# Patient Record
Sex: MALE | Race: WHITE | NOT HISPANIC OR LATINO | Employment: FULL TIME | URBAN - METROPOLITAN AREA
[De-identification: names, ages, dates, MRNs, and addresses within clinical notes are randomized per-mention and may not be internally consistent; named-entity substitution may affect disease eponyms.]

---

## 2023-11-29 ENCOUNTER — APPOINTMENT (OUTPATIENT)
Dept: OPHTHALMOLOGY | Facility: CLINIC | Age: 68
End: 2023-11-29
Payer: COMMERCIAL

## 2023-12-10 ASSESSMENT — EXTERNAL EXAM - RIGHT EYE: OD_EXAM: NORMAL

## 2023-12-10 ASSESSMENT — EXTERNAL EXAM - LEFT EYE: OS_EXAM: NORMAL

## 2023-12-10 ASSESSMENT — CUP TO DISC RATIO: OS_RATIO: 0.6

## 2023-12-10 ASSESSMENT — SLIT LAMP EXAM - LIDS
COMMENTS: GOOD POSITION
COMMENTS: GOOD POSITION

## 2023-12-11 ENCOUNTER — OFFICE VISIT (OUTPATIENT)
Dept: OPHTHALMOLOGY | Facility: CLINIC | Age: 68
End: 2023-12-11
Payer: COMMERCIAL

## 2023-12-11 DIAGNOSIS — H52.4 PRESBYOPIA: ICD-10-CM

## 2023-12-11 DIAGNOSIS — Z98.41: ICD-10-CM

## 2023-12-11 DIAGNOSIS — H43.9 VITREOUS DEBRIS: ICD-10-CM

## 2023-12-11 DIAGNOSIS — H40.002 GLAUCOMA SUSPECT, LEFT EYE: ICD-10-CM

## 2023-12-11 DIAGNOSIS — H35.371 EPIRETINAL MEMBRANE, RIGHT EYE: ICD-10-CM

## 2023-12-11 DIAGNOSIS — H52.13 MYOPIA OF BOTH EYES: ICD-10-CM

## 2023-12-11 DIAGNOSIS — Z83.518 FAMILY HISTORY OF MACULAR DEGENERATION: ICD-10-CM

## 2023-12-11 DIAGNOSIS — Z96.1 PSEUDOPHAKIA: ICD-10-CM

## 2023-12-11 DIAGNOSIS — H18.513 CORNEAL GUTTATA OF BOTH EYES: ICD-10-CM

## 2023-12-11 DIAGNOSIS — H26.492 PCO (POSTERIOR CAPSULAR OPACIFICATION), LEFT: ICD-10-CM

## 2023-12-11 DIAGNOSIS — H53.2 DIPLOPIA: ICD-10-CM

## 2023-12-11 DIAGNOSIS — H40.1111 PRIMARY OPEN ANGLE GLAUCOMA (POAG) OF RIGHT EYE, MILD STAGE: Primary | ICD-10-CM

## 2023-12-11 PROCEDURE — 92133 CPTRZD OPH DX IMG PST SGM ON: CPT | Performed by: OPHTHALMOLOGY

## 2023-12-11 PROCEDURE — 99214 OFFICE O/P EST MOD 30 MIN: CPT | Performed by: OPHTHALMOLOGY

## 2023-12-11 ASSESSMENT — REFRACTION_WEARINGRX
OS_CYLINDER: -0.25
OS_ADD: +2.50
OS_SPHERE: -2.25
OD_SPHERE: -1.75
OD_ADD: +2.50
OS_AXIS: 090
OD_CYLINDER: SPHERE

## 2023-12-11 ASSESSMENT — REFRACTION_MANIFEST
OS_SPHERE: -2.25
OS_ADD: +2.50
OD_ADD: +2.50
OD_CYLINDER: SPHERE
OD_SPHERE: -1.75
OS_AXIS: 090
OS_CYLINDER: -0.25

## 2023-12-11 ASSESSMENT — TONOMETRY
IOP_METHOD: GOLDMANN APPLANATION
OS_IOP_MMHG: 10
OD_IOP_MMHG: 10

## 2023-12-11 ASSESSMENT — VISUAL ACUITY
METHOD: SNELLEN - LINEAR
OD_CC: 20/20
OS_CC: 20/20

## 2023-12-11 ASSESSMENT — ENCOUNTER SYMPTOMS: EYES NEGATIVE: 1

## 2023-12-11 ASSESSMENT — CUP TO DISC RATIO: OD_RATIO: 0.75

## 2023-12-11 NOTE — PROGRESS NOTES
Open-angle glaucoma of right eye, mild [id number]  Glaucoma suspect of left eyeH40.002  -(+)FH glaucoma - father (traumatic glaucoma)  -Was considered a glaucoma suspect in the past at [facility].  -Increased cup to disc ratio OU. IOP WNL.   -Pachymetry (2/26/20) - 620/608  -OCT RNFL (11/29/22) - OD: Thin S. OS: Bord S. 87/84. Stable from 6/19/19.   -HVF 24-2 (5/24/23) - OD: 1/14FL 4%FP 2%FN. Possible early inferior arcuate. OS: 1/14FL 4%FP 3%FN. WNL. Stable from 11/10/21.   -Saw Dr. Waters March 2020 - agree glaucoma suspect and consider treating if any change/reproducibility on visual field testing.   -Plan: Thick corneas, normal IOP, however borderline changes on OCT RNFL and HVF 24-2 OD>OS. Changes/defects have been reproducible on subsequent testing. Given this information, intiated treatment OD - started Timolol 0.5% OD qAM (11/10/21), based on OCT RNFL, recommend using Timolol OU qAM (7/27/22) - continue OU. F/u 6 months for comprehensive exam with refraction, dilation, OCT RNFL.     History of YAG laser capsulotomy of lens of right eyeZ98.41  Pseudophakia of left eyeZ96.1 - 3/18/21  PCO (posterior capsular opacification), [id number]  Pseudophakia of right eyeZ96.1 - 7/11/19  -S/p YAG capsulotomy OD 10/18/22 - vision improved. No retinal tear or detachment seen on exam. Monitor with annual dilated exams unless symptoms arise. F/u 6 months for comprehensive exam with refraction, dilation, OCT RNFL.     Vitreous dsvcxvZ08.9  Epiretinal membrane (ERM) of right eyeH35.371  -OCT macula (5/24/23) - SS: 10/10 OD and 9/10 OS. OD: Mild ERM temporal to fovea. No edema. Intact IS-OS. OS: Normal thickness and contour. Intact IS-OS. No edema. 320/273. Stable from 9/28/22.    -Noticing floater OD is more symptomatic, mostly when both eyes are open, not as much if OS is closed. Unclear reason for this unless also noticing floaters OS when both eyes are open. No retinal tear or detachment seen on last dilated exam.  Monitor with annual dilated exams. Recommend observation for now and consider PPV if becomes more symptomatic.  -Monitor ERM for now, plan to recheck OCT macula in 1 year.  We will consider referral to retina service as needed.     Cornea jqoklekO63.51  -Rare guttata OU. No significant corneal edema at this time. Monitor.     Family history of macular ounkhudhksxvK33.518  -(+)Father - wet?  -Macular exam WNL at this time. Monitor.     Bilateral jrqnjjB63.13  Bilateral sxecsxscbbX86.4  -F/u 6 months for comprehensive exam with refraction, dilation, OCT RNFL.

## 2023-12-11 NOTE — PROGRESS NOTES
Open-angle glaucoma of right eye, mild pobxoC26.10X1  Glaucoma suspect of left eyeH40.002  -(+)FH glaucoma - father (traumatic glaucoma)  -Was considered a glaucoma suspect in the past at [facility].  -Increased cup to disc ratio OU. IOP WNL.   -Pachymetry (2/26/20) - 620/608  -OCT RNFL (12/11/23) - SS: 7/10 OU. OD: Thin S. OS: Thin S. 82/79. Stable OD, possible superior progression OS compared to 11/29/22.   -HVF 24-2 (5/24/23) - OD: 1/14FL 4%FP 2%FN. Possible early inferior arcuate. OS: 1/14FL 4%FP 3%FN. WNL. Stable from 11/10/21.   -Saw Dr. Waters March 2020 - agree glaucoma suspect and consider treating if any change/reproducibility on visual field testing.   -Plan: Thick corneas, normal IOP, however borderline changes on OCT RNFL and HVF 24-2 OD>OS. Changes/defects have been reproducible on subsequent testing. Given this information, intiated treatment OD - started Timolol 0.5% OD qAM (11/10/21), based on OCT RNFL, recommend using Timolol OU qAM (7/27/22) - continue OU. F/u 3-4 months for IOP check, repeat OCT RNFL and HVF 24-2. May consider adding second gtt vs SLT if true progression seen on testing.     History of YAG laser capsulotomy of lens of right eyeZ98.41  Pseudophakia of left eyeZ96.1 - 3/18/21  PCO (posterior capsular opacification), fanihL94.491  PCO (posterior capsular opacification), left eye  Pseudophakia of right eyeZ96.1 - 7/11/19  -S/p YAG capsulotomy OD 10/18/22 - vision improved. Mild PCO OS, not visually significant. Will monitor for now and can consider YAG capsulotomy in the future if condition worsens.     Diplopia  -For the past 1-2 years, has noted some mild difficulty focusing at distance after looking at near object. Mild binocular oblique diplopia. Notices at end gaze on left and right gaze.  -On exam, full motility, no significant ocular misalignment noted, however, patient with mild right head tilt and notes that oblique diplopia is worse on right gaze.   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 Patient states in 2019 fell and hit head, had loss of consciousness.  -CT head wo contrast (2/12/19) - no acute intracranial abnormality.   -Tried prisms in office - patient did not notice any significant improvement and prefers no prism at this time. Monitor. Can consider repeat imaging if condition worsens.     Vitreous rlwihiF54.9  Epiretinal membrane (ERM) of right eyeH35.371  -OCT macula (5/24/23) - SS: 10/10 OD and 9/10 OS. OD: Mild ERM temporal to fovea. No edema. Intact IS-OS. OS: Normal thickness and contour. Intact IS-OS. No edema. 320/273. Stable from 9/28/22.    -Noticing floater OD is more symptomatic, mostly when both eyes are open, not as much if OS is closed. Unclear reason for this unless also noticing floaters OS when both eyes are open. No retinal tear or detachment seen on last dilated exam. Monitor with annual dilated exams. Recommend observation for now and consider PPV if becomes more symptomatic.  -Monitor ERM for now, plan to recheck OCT macula in 1 year.  We will consider referral to retina service as needed.     Cornea haqztndK36.51  -Rare guttata OU. No significant corneal edema at this time. Monitor.     Family history of macular suxwfhujeivbQ80.518  -(+)Father - wet?  -Macular exam WNL at this time. Monitor.     Bilateral jhetqcL49.13  Bilateral gkthmyaxfpX63.4  -Continue current glasses.

## 2024-01-24 ENCOUNTER — APPOINTMENT (OUTPATIENT)
Dept: OPHTHALMOLOGY | Facility: CLINIC | Age: 69
End: 2024-01-24
Payer: COMMERCIAL

## 2024-02-29 ENCOUNTER — OFFICE VISIT (OUTPATIENT)
Dept: PRIMARY CARE | Facility: CLINIC | Age: 69
End: 2024-02-29
Payer: COMMERCIAL

## 2024-02-29 ENCOUNTER — LAB (OUTPATIENT)
Dept: LAB | Facility: LAB | Age: 69
End: 2024-02-29
Payer: COMMERCIAL

## 2024-02-29 VITALS
HEART RATE: 67 BPM | WEIGHT: 237.6 LBS | HEIGHT: 74 IN | SYSTOLIC BLOOD PRESSURE: 157 MMHG | BODY MASS INDEX: 30.49 KG/M2 | DIASTOLIC BLOOD PRESSURE: 99 MMHG | TEMPERATURE: 97.2 F

## 2024-02-29 DIAGNOSIS — Z00.00 HEALTHCARE MAINTENANCE: Primary | ICD-10-CM

## 2024-02-29 DIAGNOSIS — I10 HYPERTENSION, UNSPECIFIED TYPE: ICD-10-CM

## 2024-02-29 DIAGNOSIS — Z00.00 HEALTHCARE MAINTENANCE: ICD-10-CM

## 2024-02-29 DIAGNOSIS — E03.9 HYPOTHYROIDISM, UNSPECIFIED TYPE: ICD-10-CM

## 2024-02-29 PROBLEM — H35.371 EPIRETINAL MEMBRANE, RIGHT EYE: Status: RESOLVED | Noted: 2023-12-11 | Resolved: 2024-02-29

## 2024-02-29 PROBLEM — H52.13 MYOPIA OF BOTH EYES: Status: RESOLVED | Noted: 2023-12-11 | Resolved: 2024-02-29

## 2024-02-29 PROBLEM — Z98.41: Status: RESOLVED | Noted: 2023-12-11 | Resolved: 2024-02-29

## 2024-02-29 PROBLEM — U07.1 COVID-19: Status: RESOLVED | Noted: 2024-02-29 | Resolved: 2024-02-29

## 2024-02-29 PROBLEM — H52.4 PRESBYOPIA: Status: RESOLVED | Noted: 2023-12-11 | Resolved: 2024-02-29

## 2024-02-29 PROBLEM — Z83.518 FAMILY HISTORY OF MACULAR DEGENERATION: Status: RESOLVED | Noted: 2023-12-11 | Resolved: 2024-02-29

## 2024-02-29 PROBLEM — H18.513 CORNEAL GUTTATA OF BOTH EYES: Status: RESOLVED | Noted: 2023-12-11 | Resolved: 2024-02-29

## 2024-02-29 PROBLEM — H53.2 DIPLOPIA: Status: RESOLVED | Noted: 2023-12-11 | Resolved: 2024-02-29

## 2024-02-29 PROBLEM — H40.1111 PRIMARY OPEN ANGLE GLAUCOMA (POAG) OF RIGHT EYE, MILD STAGE: Status: RESOLVED | Noted: 2023-12-11 | Resolved: 2024-02-29

## 2024-02-29 PROBLEM — H26.492 PCO (POSTERIOR CAPSULAR OPACIFICATION), LEFT: Status: RESOLVED | Noted: 2023-12-11 | Resolved: 2024-02-29

## 2024-02-29 LAB
ALBUMIN SERPL BCP-MCNC: 4.4 G/DL (ref 3.4–5)
ALP SERPL-CCNC: 63 U/L (ref 33–136)
ALT SERPL W P-5'-P-CCNC: 30 U/L (ref 10–52)
ANION GAP SERPL CALC-SCNC: 11 MMOL/L (ref 10–20)
AST SERPL W P-5'-P-CCNC: 19 U/L (ref 9–39)
BASOPHILS # BLD AUTO: 0.04 X10*3/UL (ref 0–0.1)
BASOPHILS NFR BLD AUTO: 0.6 %
BILIRUB SERPL-MCNC: 0.6 MG/DL (ref 0–1.2)
BUN SERPL-MCNC: 19 MG/DL (ref 6–23)
CALCIUM SERPL-MCNC: 9.5 MG/DL (ref 8.6–10.6)
CHLORIDE SERPL-SCNC: 103 MMOL/L (ref 98–107)
CHOLEST SERPL-MCNC: 177 MG/DL (ref 0–199)
CHOLESTEROL/HDL RATIO: 3.7
CO2 SERPL-SCNC: 32 MMOL/L (ref 21–32)
CREAT SERPL-MCNC: 1.17 MG/DL (ref 0.5–1.3)
EGFRCR SERPLBLD CKD-EPI 2021: 67 ML/MIN/1.73M*2
EOSINOPHIL # BLD AUTO: 0.29 X10*3/UL (ref 0–0.7)
EOSINOPHIL NFR BLD AUTO: 4.3 %
ERYTHROCYTE [DISTWIDTH] IN BLOOD BY AUTOMATED COUNT: 12.1 % (ref 11.5–14.5)
GLUCOSE SERPL-MCNC: 93 MG/DL (ref 74–99)
HCT VFR BLD AUTO: 46.2 % (ref 41–52)
HDLC SERPL-MCNC: 47.6 MG/DL
HGB BLD-MCNC: 15 G/DL (ref 13.5–17.5)
IMM GRANULOCYTES # BLD AUTO: 0.02 X10*3/UL (ref 0–0.7)
IMM GRANULOCYTES NFR BLD AUTO: 0.3 % (ref 0–0.9)
LDLC SERPL CALC-MCNC: 99 MG/DL
LYMPHOCYTES # BLD AUTO: 1.87 X10*3/UL (ref 1.2–4.8)
LYMPHOCYTES NFR BLD AUTO: 27.8 %
MCH RBC QN AUTO: 30.6 PG (ref 26–34)
MCHC RBC AUTO-ENTMCNC: 32.5 G/DL (ref 32–36)
MCV RBC AUTO: 94 FL (ref 80–100)
MONOCYTES # BLD AUTO: 0.66 X10*3/UL (ref 0.1–1)
MONOCYTES NFR BLD AUTO: 9.8 %
NEUTROPHILS # BLD AUTO: 3.84 X10*3/UL (ref 1.2–7.7)
NEUTROPHILS NFR BLD AUTO: 57.2 %
NON HDL CHOLESTEROL: 129 MG/DL (ref 0–149)
NRBC BLD-RTO: 0 /100 WBCS (ref 0–0)
PLATELET # BLD AUTO: 188 X10*3/UL (ref 150–450)
POTASSIUM SERPL-SCNC: 4.5 MMOL/L (ref 3.5–5.3)
PROT SERPL-MCNC: 6.9 G/DL (ref 6.4–8.2)
PSA SERPL-MCNC: 1.54 NG/ML
RBC # BLD AUTO: 4.9 X10*6/UL (ref 4.5–5.9)
SODIUM SERPL-SCNC: 141 MMOL/L (ref 136–145)
TRIGL SERPL-MCNC: 153 MG/DL (ref 0–149)
TSH SERPL-ACNC: 2.17 MIU/L (ref 0.44–3.98)
VLDL: 31 MG/DL (ref 0–40)
WBC # BLD AUTO: 6.7 X10*3/UL (ref 4.4–11.3)

## 2024-02-29 PROCEDURE — 84153 ASSAY OF PSA TOTAL: CPT

## 2024-02-29 PROCEDURE — 99397 PER PM REEVAL EST PAT 65+ YR: CPT | Performed by: STUDENT IN AN ORGANIZED HEALTH CARE EDUCATION/TRAINING PROGRAM

## 2024-02-29 PROCEDURE — 80053 COMPREHEN METABOLIC PANEL: CPT

## 2024-02-29 PROCEDURE — 80061 LIPID PANEL: CPT

## 2024-02-29 PROCEDURE — 84443 ASSAY THYROID STIM HORMONE: CPT

## 2024-02-29 PROCEDURE — 3080F DIAST BP >= 90 MM HG: CPT | Performed by: STUDENT IN AN ORGANIZED HEALTH CARE EDUCATION/TRAINING PROGRAM

## 2024-02-29 PROCEDURE — 3077F SYST BP >= 140 MM HG: CPT | Performed by: STUDENT IN AN ORGANIZED HEALTH CARE EDUCATION/TRAINING PROGRAM

## 2024-02-29 PROCEDURE — 1036F TOBACCO NON-USER: CPT | Performed by: STUDENT IN AN ORGANIZED HEALTH CARE EDUCATION/TRAINING PROGRAM

## 2024-02-29 PROCEDURE — 85025 COMPLETE CBC W/AUTO DIFF WBC: CPT

## 2024-02-29 PROCEDURE — 36415 COLL VENOUS BLD VENIPUNCTURE: CPT

## 2024-02-29 PROCEDURE — 1126F AMNT PAIN NOTED NONE PRSNT: CPT | Performed by: STUDENT IN AN ORGANIZED HEALTH CARE EDUCATION/TRAINING PROGRAM

## 2024-02-29 RX ORDER — TIMOLOL MALEATE 5 MG/ML
1 SOLUTION/ DROPS OPHTHALMIC DAILY
COMMUNITY
Start: 2021-11-10

## 2024-02-29 RX ORDER — LEVOTHYROXINE SODIUM 100 UG/1
100 TABLET ORAL DAILY
Qty: 90 TABLET | Refills: 3 | Status: SHIPPED | OUTPATIENT
Start: 2024-02-29 | End: 2025-02-28

## 2024-02-29 RX ORDER — LEVOTHYROXINE SODIUM 100 UG/1
100 TABLET ORAL DAILY
COMMUNITY
End: 2024-02-29 | Stop reason: SDUPTHER

## 2024-02-29 ASSESSMENT — ENCOUNTER SYMPTOMS
LOSS OF SENSATION IN FEET: 0
OCCASIONAL FEELINGS OF UNSTEADINESS: 0
DEPRESSION: 0

## 2024-02-29 ASSESSMENT — PAIN SCALES - GENERAL: PAINLEVEL: 0-NO PAIN

## 2024-02-29 NOTE — PATIENT INSTRUCTIONS
Please stop at the lab ([address]) to complete your blood and/or urine work that I've ordered for you.    I will contact you with the results at my soonest convenience. I strongly urge you to use [employer] as this is the quickest and easiest way to access your results and receive my correspondences.    Your blood pressure is not at target. Check your blood pressure daily and let me know how they are doing.    I have renewed your chronic medications today.

## 2024-02-29 NOTE — PROGRESS NOTES
"Subjective   Patient ID: Eleazar Fitzpatrick Jr. is a 68 y.o. male who presents for No chief complaint on file..    HPI     Doing well since last in. No acute events. Fully recovered from COVID back in 2023.     Re: Thyroid adenoma - remote diagnosis of a benign thyroid adenoma on L lobe of thyroid during [facility]'s comprehensive [facility] medicine evaluation. Had the L lobe removed; following that has been on 100mcg as suppressive dose for the remainder of the thyroid.     Re: HM - up to date on colonoscopy (2018, repeat 2028). PSA due yearly. Declines shots today.      PMHx, FHx, Social Hx, Surg Hx personally reviewed at this appointment. No pertinent findings and/or changes from prior (if applicable).     ROS: Denies wt gain/loss f/c HA LoC CP SOB NVDC. See HPI above, and scanned sheet (if applicable). All other systems are reviewed and are without complaint.         Review of Systems    Objective   BP (!) 157/99   Pulse 67   Temp 36.2 °C (97.2 °F)   Ht 1.88 m (6' 2\")   Wt 108 kg (237 lb 9.6 oz)   BMI 30.51 kg/m²     Physical Exam  Gen: well developed in NAD. AAO x3.  HEENT: NC/AT. Anicteric sclera, symmetric pupils. MMM no thrush.  Neck: Soft, supple. No LAD. No goiter.   CV: RRR nl s1s2 no m/r/g  Pulm: CTAB no w/r/r, good air exchange  GI: soft NTND BS+ no hsm  Ext: WWP no edema  Neuro: II-XII grossly intact, nonfocal systemic findings  MSK: 5/5 strength b/l UE and LE  Gait: unremarkable    Lab Results   Component Value Date    WBC 18.6 (H) 01/31/2023    HGB 14.7 01/31/2023    HCT 41.6 01/31/2023     01/31/2023    CHOL 191 02/22/2022    TRIG 92 02/22/2022    HDL 52.1 02/22/2022    ALT 74 (H) 01/29/2023     (H) 01/29/2023     01/31/2023    K 4.2 01/31/2023     01/31/2023    CREATININE 1.15 01/31/2023    BUN 36 (H) 01/31/2023    CO2 23 01/31/2023    TSH 1.32 01/29/2023    PSA 0.94 01/27/2020    INR 1.2 (H) 01/29/2023     par    OCT, Optic Nerve - OU - Both Eyes  Right Eye  Images reviewed " and comparison made to baseline, Images reviewed. To   assess optic nerve function and for use in future follow-up. Reliability:   borderline.     Left Eye  Images reviewed and comparison made to baseline, Images reviewed. To   assess optic nerve function and for use in future follow-up. Reliability:   borderline.     Notes  Retinal multimodal imaging including photography was completed, and the   findings are described in the examination.    OCT RNFL (12/11/23) - SS: 7/10 OU. OD: Thin S. OS: Thin S. 82/79. Stable   OD, possible superior progression OS compared to 11/29/22.    Corneal Topography - OU - Both Eyes  Entered in error, test not performed     Assessment/Plan   In stable health. BP elevated, recommend ambulatory pressures    # Elevated pressures: not at goal  - ambulatory pressures, RTC 4-8 weeks with BP log  - routine blood/urine work, if not recent  - lifestyle modifications discussed at length     # Thyroid Adenoma s/p removal  - TSH with reflex to free T4  - will adjust meds based on lab values; continue 100mcg for now     # Health Maintenance  - routine blood work  - Colonoscopy: UTD (due 12/2028)  - PSA: due, ordered today   - Immunizations: recommend Shingrix

## 2024-03-08 ASSESSMENT — EXTERNAL EXAM - LEFT EYE: OS_EXAM: NORMAL

## 2024-03-08 ASSESSMENT — CUP TO DISC RATIO
OS_RATIO: 0.6
OD_RATIO: 0.75

## 2024-03-08 ASSESSMENT — EXTERNAL EXAM - RIGHT EYE: OD_EXAM: NORMAL

## 2024-03-08 ASSESSMENT — SLIT LAMP EXAM - LIDS: COMMENTS: GOOD POSITION

## 2024-03-09 NOTE — PROGRESS NOTES
Open-angle glaucoma of right eye, mild [id number]  Glaucoma suspect of left eyeH40.002  -(+)FH glaucoma - father (traumatic glaucoma)  -Was considered a glaucoma suspect in the past at [facility].  -Increased cup to disc ratio OU. IOP WNL.   -Pachymetry (2/26/20) - 620/608  -OCT RNFL (12/11/23) - SS: 7/10 OU. OD: Thin S. OS: Thin S. 82/79. Stable OD, possible superior progression OS compared to 11/29/22.   -OCT RNFL (3/13/24) - SS: 8/10 OD and 9/10 OS. OD: Thin S. OS: WNL. 86/84. Stable from 11/10/21.   -HVF 24-2 (3/13/24) - OD: 2%FP 3%FN. Scattered, possible early inferior arcuate. OS: Good reliability. Scattered, but WNL. Mild variation from prior HVF, but overall stable pattern.   -Saw Dr. Waters March 2020 - agree glaucoma suspect and consider treating if any change/reproducibility on visual field testing.   -Plan: Thick corneas, normal IOP, however borderline changes on OCT RNFL and HVF 24-2 OD>OS. Changes/defects have been reproducible on subsequent testing. Given this information, intiated treatment OD - started Timolol 0.5% OD qAM (11/10/21), based on OCT RNFL, recommend using Timolol OU qAM (7/27/22) - continue OU. F/u 4 months for IOP check. May consider adding second gtt vs SLT if true progression seen on testing. Plan to repeat OCT RNFL at next DFE and HVF 24-2 in 1 year.     History of YAG laser capsulotomy of lens of right eyeZ98.41  Pseudophakia of left eyeZ96.1 - 3/18/21  PCO (posterior capsular opacification), [id number]  PCO (posterior capsular opacification), left eye  Pseudophakia of right eyeZ96.1 - 7/11/19  -S/p YAG capsulotomy OD 10/18/22 - vision improved. Mild PCO OS, not visually significant. Will monitor for now and can consider YAG capsulotomy in the future if condition worsens.     Diplopia  -For the past 1-2 years, has noted some mild difficulty focusing at distance after looking at near object. Mild binocular oblique diplopia. Notices at end gaze on left and right gaze.  -On exam,  full motility, no significant ocular misalignment noted, however, patient with mild right head tilt and notes that oblique diplopia is worse on right gaze.   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 Patient states in 2019 fell and hit head, had loss of consciousness.  -CT head wo contrast (2/12/19) - no acute intracranial abnormality.   -Tried prisms in office - patient did not notice any significant improvement and prefers no prism at this time. Monitor. Can consider repeat imaging if condition worsens. Condition is stable.    Vitreous qjdsrfF82.9  Epiretinal membrane (ERM) of right eyeH35.371  -OCT macula (5/24/23) - SS: 10/10 OD and 9/10 OS. OD: Mild ERM temporal to fovea. No edema. Intact IS-OS. OS: Normal thickness and contour. Intact IS-OS. No edema. 320/273. Stable from 9/28/22.    -Noticing floater OD is more symptomatic, mostly when both eyes are open, not as much if OS is closed. Unclear reason for this unless also noticing floaters OS when both eyes are open. No retinal tear or detachment seen on last dilated exam. Monitor with annual dilated exams. Recommend observation for now and consider PPV if becomes more symptomatic.  -Monitor ERM for now, plan to recheck OCT macula with next DFE.  We will consider referral to retina service as needed.     Cornea oznnuuaL56.51  -Rare guttata OU. No significant corneal edema at this time. Monitor.     Family history of macular phgbmiczkenfP20.518  -(+)Father - wet?  -Macular exam WNL at this time. Monitor.     Bilateral upjtdpB98.13  Bilateral spiecfaiffF34.4  -Continue current glasses.   -Last comprehensive exam 12/11/23.

## 2024-03-13 ENCOUNTER — OFFICE VISIT (OUTPATIENT)
Dept: OPHTHALMOLOGY | Facility: CLINIC | Age: 69
End: 2024-03-13
Payer: COMMERCIAL

## 2024-03-13 ENCOUNTER — APPOINTMENT (OUTPATIENT)
Dept: OPHTHALMOLOGY | Facility: CLINIC | Age: 69
End: 2024-03-13
Payer: COMMERCIAL

## 2024-03-13 DIAGNOSIS — Z96.1 PSEUDOPHAKIA: ICD-10-CM

## 2024-03-13 DIAGNOSIS — Z83.518 FAMILY HISTORY OF MACULAR DEGENERATION: ICD-10-CM

## 2024-03-13 DIAGNOSIS — H18.513 CORNEAL GUTTATA OF BOTH EYES: ICD-10-CM

## 2024-03-13 DIAGNOSIS — H52.13 MYOPIA OF BOTH EYES: ICD-10-CM

## 2024-03-13 DIAGNOSIS — Z98.41: ICD-10-CM

## 2024-03-13 DIAGNOSIS — H40.002 GLAUCOMA SUSPECT, LEFT EYE: ICD-10-CM

## 2024-03-13 DIAGNOSIS — H52.4 PRESBYOPIA: ICD-10-CM

## 2024-03-13 DIAGNOSIS — H35.371 EPIRETINAL MEMBRANE, RIGHT EYE: ICD-10-CM

## 2024-03-13 DIAGNOSIS — H26.492 PCO (POSTERIOR CAPSULAR OPACIFICATION), LEFT: ICD-10-CM

## 2024-03-13 DIAGNOSIS — H53.2 DIPLOPIA: ICD-10-CM

## 2024-03-13 DIAGNOSIS — H43.9 VITREOUS DEBRIS: ICD-10-CM

## 2024-03-13 DIAGNOSIS — H40.1111 PRIMARY OPEN ANGLE GLAUCOMA (POAG) OF RIGHT EYE, MILD STAGE: Primary | ICD-10-CM

## 2024-03-13 PROCEDURE — 92083 EXTENDED VISUAL FIELD XM: CPT | Performed by: OPHTHALMOLOGY

## 2024-03-13 PROCEDURE — 92133 CPTRZD OPH DX IMG PST SGM ON: CPT | Performed by: OPHTHALMOLOGY

## 2024-03-13 PROCEDURE — 99213 OFFICE O/P EST LOW 20 MIN: CPT | Performed by: OPHTHALMOLOGY

## 2024-03-13 ASSESSMENT — REFRACTION_WEARINGRX
OS_SPHERE: -2.25
OS_CYLINDER: -0.25
OD_CYLINDER: SPHERE
OS_ADD: +2.50
OS_AXIS: 090
OD_SPHERE: -1.75
OD_ADD: +2.50

## 2024-03-13 ASSESSMENT — ENCOUNTER SYMPTOMS
CARDIOVASCULAR NEGATIVE: 0
HEMATOLOGIC/LYMPHATIC NEGATIVE: 0
ENDOCRINE NEGATIVE: 0
GASTROINTESTINAL NEGATIVE: 0
RESPIRATORY NEGATIVE: 0
MUSCULOSKELETAL NEGATIVE: 0
PSYCHIATRIC NEGATIVE: 0
CONSTITUTIONAL NEGATIVE: 0
EYES NEGATIVE: 1
ALLERGIC/IMMUNOLOGIC NEGATIVE: 0
NEUROLOGICAL NEGATIVE: 0

## 2024-03-13 ASSESSMENT — PACHYMETRY
OD_CT(UM): 620
OS_CT(UM): 608

## 2024-03-13 ASSESSMENT — VISUAL ACUITY
METHOD: SNELLEN - LINEAR
OS_CC: 20/20
OD_CC: 20/20
CORRECTION_TYPE: GLASSES

## 2024-03-13 ASSESSMENT — TONOMETRY
OD_IOP_MMHG: 13
IOP_METHOD: GOLDMANN APPLANATION
OS_IOP_MMHG: 12

## 2024-07-09 ASSESSMENT — EXTERNAL EXAM - RIGHT EYE: OD_EXAM: NORMAL

## 2024-07-09 ASSESSMENT — CUP TO DISC RATIO
OD_RATIO: 0.75
OS_RATIO: 0.6

## 2024-07-09 ASSESSMENT — SLIT LAMP EXAM - LIDS: COMMENTS: GOOD POSITION

## 2024-07-09 ASSESSMENT — EXTERNAL EXAM - LEFT EYE: OS_EXAM: NORMAL

## 2024-07-09 NOTE — PROGRESS NOTES
Open-angle glaucoma of right eye, mild [id number]  Glaucoma suspect of left eyeH40.002  -(+)FH glaucoma - father (traumatic glaucoma)  -Was considered a glaucoma suspect in the past at [facility].  -Increased cup to disc ratio OU. IOP WNL.   -Pachymetry (2/26/20) - 620/608  -OCT RNFL (12/11/23) - SS: 7/10 OU. OD: Thin S. OS: Thin S. 82/79. Stable OD, possible superior progression OS compared to 11/29/22.   -OCT RNFL (3/13/24) - SS: 8/10 OD and 9/10 OS. OD: Thin S. OS: WNL. 86/84. Stable from 11/10/21.   -HVF 24-2 (3/13/24) - OD: 2%FP 3%FN. Scattered, possible early inferior arcuate. OS: Good reliability. Scattered, but WNL. Mild variation from prior HVF, but overall stable pattern.   -Saw Dr. Wtaers March 2020 - agree glaucoma suspect and consider treating if any change/reproducibility on visual field testing.   -Plan: Thick corneas, normal IOP, however borderline changes on OCT RNFL and HVF 24-2 OD>OS. Changes/defects have been reproducible on subsequent testing. Given this information, intiated treatment OD - started Timolol 0.5% OD qAM (11/10/21), based on OCT RNFL, recommend using Timolol OU qAM (7/27/22) - continue OU. May consider adding second gtt vs SLT if true progression seen on testing.   -F/u 6 months for comprehensive exam (autorefract first) with OCT RNFL and OCT macula.      History of YAG laser capsulotomy of lens of right eyeZ98.41  Pseudophakia of left eyeZ96.1 - 3/18/21  PCO (posterior capsular opacification), duzqvV59.491  PCO (posterior capsular opacification), left eye  Pseudophakia of right eyeZ96.1 - 7/11/19  -S/p YAG capsulotomy OD 10/18/22 - vision improved. Mild PCO OS, not visually significant. Will monitor for now and can consider YAG capsulotomy in the future if condition worsens.     Diplopia  -Since 2021/2022, has noted some mild difficulty focusing at distance after looking at near object. Mild binocular oblique diplopia. Notices at end gaze on left and right gaze.  -On exam, full  motility, no significant ocular misalignment noted, however, patient with mild right head tilt and notes that oblique diplopia is worse on right gaze.   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 Patient states in 2019 fell and hit head, had loss of consciousness.  -CT head wo contrast (2/12/19) - no acute intracranial abnormality.   -Tried prisms in office - patient did not notice any significant improvement and prefers no prism at this time. Monitor. Can consider repeat imaging if condition worsens. Condition is stable.    Vitreous aebfgrJ82.9  Epiretinal membrane (ERM) of right eyeH35.371  -OCT macula (5/24/23) - SS: 10/10 OD and 9/10 OS. OD: Mild ERM temporal to fovea. No edema. Intact IS-OS. OS: Normal thickness and contour. Intact IS-OS. No edema. 320/273. Stable from 9/28/22.    -Noticing floater OD is more symptomatic, mostly when both eyes are open, not as much if OS is closed. Unclear reason for this unless also noticing floaters OS when both eyes are open. No retinal tear or detachment seen on last dilated exam. Monitor with annual dilated exams. Recommend observation for now and consider PPV if becomes more symptomatic.  -Monitor ERM for now, plan to recheck OCT macula with next DFE.  We will consider referral to retina service as needed.     Cornea ryzhfamY94.51  -Rare guttata OU. No significant corneal edema at this time. Monitor.     Family history of macular nfbdqujgiutzG16.518  -(+)Father - wet?  -Macular exam WNL at this time. Monitor.     Bilateral bqfymjR24.13  Bilateral itlqtxipnkQ69.4  -Continue current glasses. Current glasses 8 months old.   -Last comprehensive exam 12/11/23.   -F/u 6 months for comprehensive exam (autorefract first) with OCT RNFL and OCT macula.

## 2024-07-10 ENCOUNTER — APPOINTMENT (OUTPATIENT)
Dept: OPHTHALMOLOGY | Facility: CLINIC | Age: 69
End: 2024-07-10
Payer: COMMERCIAL

## 2024-07-10 DIAGNOSIS — H35.371 EPIRETINAL MEMBRANE, RIGHT EYE: ICD-10-CM

## 2024-07-10 DIAGNOSIS — H26.492 PCO (POSTERIOR CAPSULAR OPACIFICATION), LEFT: ICD-10-CM

## 2024-07-10 DIAGNOSIS — H40.1111 PRIMARY OPEN ANGLE GLAUCOMA (POAG) OF RIGHT EYE, MILD STAGE: Primary | ICD-10-CM

## 2024-07-10 DIAGNOSIS — H18.513 CORNEAL GUTTATA OF BOTH EYES: ICD-10-CM

## 2024-07-10 DIAGNOSIS — H53.2 DIPLOPIA: ICD-10-CM

## 2024-07-10 DIAGNOSIS — H43.9 VITREOUS DEBRIS: ICD-10-CM

## 2024-07-10 DIAGNOSIS — H52.4 PRESBYOPIA: ICD-10-CM

## 2024-07-10 DIAGNOSIS — Z96.1 PSEUDOPHAKIA: ICD-10-CM

## 2024-07-10 DIAGNOSIS — Z98.41: ICD-10-CM

## 2024-07-10 DIAGNOSIS — H52.13 MYOPIA OF BOTH EYES: ICD-10-CM

## 2024-07-10 DIAGNOSIS — Z83.518 FAMILY HISTORY OF MACULAR DEGENERATION: ICD-10-CM

## 2024-07-10 DIAGNOSIS — H40.002 GLAUCOMA SUSPECT, LEFT EYE: ICD-10-CM

## 2024-07-10 PROCEDURE — 99213 OFFICE O/P EST LOW 20 MIN: CPT | Performed by: OPHTHALMOLOGY

## 2024-07-10 ASSESSMENT — VISUAL ACUITY
CORRECTION_TYPE: GLASSES
OS_CC: 20/20
OD_CC: 20/20
METHOD: SNELLEN - LINEAR

## 2024-07-10 ASSESSMENT — ENCOUNTER SYMPTOMS
CONSTITUTIONAL NEGATIVE: 0
EYES NEGATIVE: 1
ENDOCRINE NEGATIVE: 0
MUSCULOSKELETAL NEGATIVE: 0
NEUROLOGICAL NEGATIVE: 0
CARDIOVASCULAR NEGATIVE: 0
PSYCHIATRIC NEGATIVE: 0
GASTROINTESTINAL NEGATIVE: 0
HEMATOLOGIC/LYMPHATIC NEGATIVE: 0
RESPIRATORY NEGATIVE: 0
ALLERGIC/IMMUNOLOGIC NEGATIVE: 0

## 2024-07-10 ASSESSMENT — REFRACTION_WEARINGRX
OS_AXIS: 090
OD_ADD: +2.50
OD_CYLINDER: SPHERE
OS_CYLINDER: -0.25
OS_ADD: +2.50
OS_SPHERE: -2.25
OD_SPHERE: -1.75

## 2024-07-10 ASSESSMENT — TONOMETRY
IOP_METHOD: GOLDMANN APPLANATION
IOP_METHOD: GOLDMANN APPLANATION
OD_IOP_MMHG: 14
OS_IOP_MMHG: 16
OS_IOP_MMHG: 16
OD_IOP_MMHG: 14

## 2024-07-10 ASSESSMENT — PACHYMETRY
OS_CT(UM): 608
OD_CT(UM): 620

## 2025-01-06 ASSESSMENT — SLIT LAMP EXAM - LIDS: COMMENTS: GOOD POSITION

## 2025-01-06 ASSESSMENT — CUP TO DISC RATIO
OD_RATIO: 0.75
OS_RATIO: 0.6

## 2025-01-06 ASSESSMENT — EXTERNAL EXAM - RIGHT EYE: OD_EXAM: NORMAL

## 2025-01-06 ASSESSMENT — EXTERNAL EXAM - LEFT EYE: OS_EXAM: NORMAL

## 2025-01-06 NOTE — PROGRESS NOTES
Open-angle glaucoma of right eye, mild [id number]  Glaucoma suspect of left eyeH40.002  -(+)FH glaucoma - father (traumatic glaucoma)  -Was considered a glaucoma suspect in the past at [facility].  -Increased cup to disc ratio OU. IOP WNL.   -Pachymetry (2/26/20) - 620/608  -OCT RNFL (1/15/25) - SS: 7/10 OD and 6/10 OS. OD: Thin S. OS: Thin S. 83/76. Similar to 12/11/23.   -HVF 24-2 (3/13/24) - OD: 2%FP 3%FN. Scattered, possible early inferior arcuate. OS: Good reliability. Scattered, but WNL. Mild variation from prior HVF, but overall stable pattern.   -Saw Dr. Waters March 2020 - agree glaucoma suspect and consider treating if any change/reproducibility on visual field testing.   -Plan: Thick corneas, normal IOP, however borderline changes on OCT RNFL and HVF 24-2 OD>OS. Changes/defects have been reproducible on subsequent testing. Given this information, intiated treatment OD - started Timolol 0.5% OD qAM (11/10/21), based on OCT RNFL, recommend using Timolol OU qAM (7/27/22) - continue OU. May consider adding second gtt vs SLT if true progression seen on testing.   -Due to potential progression since 2021, recommend evaluation with glaucoma service in 2-3 months. F/u with me 6 months for IOP check, diplopia check, HVF 24-2.     History of YAG laser capsulotomy of lens of right eyeZ98.41  Pseudophakia of left eyeZ96.1 - 3/18/21  PCO (posterior capsular opacification), ommcwN91.491  PCO (posterior capsular opacification), left eye  Pseudophakia of right eyeZ96.1 - 7/11/19  -S/p YAG capsulotomy OD 10/18/22 - vision improved. Mild PCO OS, not visually significant. Will monitor for now and can consider YAG capsulotomy in the future if condition worsens.     Diplopia  -Since 2021/2022, has noted some mild difficulty focusing at distance after looking at near object. Mild binocular oblique diplopia. Notices at end gaze on left and right gaze.  -On exam, full motility, no significant ocular misalignment noted (<1pd  BD over OD), however, patient with mild right head tilt and notes that oblique diplopia is worse on right gaze.   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 Patient states in 2019 fell and hit head, had loss of consciousness.  -CT head wo contrast (2/12/19) - no acute intracranial abnormality.   -Tried prisms in office - patient previously did not notice any significant improvement, however, has become more symptomatic over time and will consider trying prism now. Will Rx 2pd BD over OD. This correction is not consistent with LHT, however, this is what helps relieve diplopia in office (checked multiple times) Monitor. Can consider repeat imaging if condition worsens. Condition is stable.    Vitreous uiaptzR05.9  Epiretinal membrane (ERM) of right eyeH35.371  -OCT macula (1/15/25) - SS: 7/10 OD and 7/10 OS. OD: ERM temporal to fovea. No edema. Intact IS-OS. OS: Normal thickness and contour. Intact IS-OS. No edema. 338/261. Stable from 9/28/22.    -Noticing floater OD is more symptomatic, mostly when both eyes are open, not as much if OS is closed. Unclear reason for this unless also noticing floaters OS when both eyes are open. No retinal tear or detachment seen on last dilated exam. Monitor with annual dilated exams. Recommend observation for now and consider PPV if becomes more symptomatic.  -Monitor ERM for now, plan to recheck OCT macula with next DFE.  We will consider referral to retina service as needed.     Cornea fjtpiwpS92.51  -Rare guttata OU. No significant corneal edema at this time. Monitor.     Family history of macular olcoowtivjgtZ67.518  -(+)Father - wet?  -Macular exam WNL at this time. Monitor.     Bilateral pzcxuvU90.13  Bilateral igimbllgafC27.4  -New Rx given with 2pd BD over OD - advised to let me know if unable to tolerate prism.   -F/u with me 6 months for IOP check, diplopia check, HVF 24-2.

## 2025-01-15 ENCOUNTER — APPOINTMENT (OUTPATIENT)
Dept: OPHTHALMOLOGY | Facility: CLINIC | Age: 70
End: 2025-01-15
Payer: COMMERCIAL

## 2025-01-15 DIAGNOSIS — H18.513 CORNEAL GUTTATA OF BOTH EYES: ICD-10-CM

## 2025-01-15 DIAGNOSIS — H53.2 DIPLOPIA: ICD-10-CM

## 2025-01-15 DIAGNOSIS — H26.492 PCO (POSTERIOR CAPSULAR OPACIFICATION), LEFT: ICD-10-CM

## 2025-01-15 DIAGNOSIS — H40.1111 PRIMARY OPEN ANGLE GLAUCOMA (POAG) OF RIGHT EYE, MILD STAGE: Primary | ICD-10-CM

## 2025-01-15 DIAGNOSIS — Z98.41: ICD-10-CM

## 2025-01-15 DIAGNOSIS — H40.002 GLAUCOMA SUSPECT, LEFT EYE: ICD-10-CM

## 2025-01-15 DIAGNOSIS — H35.371 EPIRETINAL MEMBRANE, RIGHT EYE: ICD-10-CM

## 2025-01-15 DIAGNOSIS — H43.9 VITREOUS DEBRIS: ICD-10-CM

## 2025-01-15 DIAGNOSIS — Z83.518 FAMILY HISTORY OF MACULAR DEGENERATION: ICD-10-CM

## 2025-01-15 DIAGNOSIS — H52.4 PRESBYOPIA: ICD-10-CM

## 2025-01-15 DIAGNOSIS — H52.13 MYOPIA OF BOTH EYES: ICD-10-CM

## 2025-01-15 DIAGNOSIS — Z96.1 PSEUDOPHAKIA: ICD-10-CM

## 2025-01-15 PROCEDURE — 92014 COMPRE OPH EXAM EST PT 1/>: CPT | Performed by: OPHTHALMOLOGY

## 2025-01-15 RX ORDER — TIMOLOL MALEATE 5 MG/ML
1 SOLUTION/ DROPS OPHTHALMIC DAILY
Qty: 30 ML | Refills: 3 | Status: SHIPPED | OUTPATIENT
Start: 2025-01-15

## 2025-01-15 ASSESSMENT — REFRACTION_MANIFEST
OS_CYLINDER: -0.50
OS_AXIS: 158
OD_AXIS: 113
OS_SPHERE: -2.25
OD_SPHERE: -1.50
OD_ADD: +2.50
OD_CYLINDER: -0.25
OS_AXIS: 105
OS_SPHERE: -1.75
OS_CYLINDER: -0.50
METHOD_AUTOREFRACTION: 1
OD_AXIS: 118
OD_CYLINDER: -0.25
OS_ADD: +2.50
OD_SPHERE: -1.75

## 2025-01-15 ASSESSMENT — VISUAL ACUITY
OD_CC: 20/20
METHOD: SNELLEN - LINEAR
OS_BAT_MED: 20/25
OD_BAT_MED: 20/20
CORRECTION_TYPE: GLASSES
OS_CC: 20/20
OS_CC+: -1

## 2025-01-15 ASSESSMENT — ENCOUNTER SYMPTOMS: EYES NEGATIVE: 1

## 2025-01-15 ASSESSMENT — REFRACTION_WEARINGRX
OD_SPHERE: -1.75
OS_CYLINDER: -0.25
OD_CYLINDER: SPHERE
OD_ADD: +2.50
OS_AXIS: 118
OS_ADD: +2.50
OS_SPHERE: -2.25
SPECS_TYPE: PAL

## 2025-01-15 ASSESSMENT — CONF VISUAL FIELD
OS_INFERIOR_NASAL_RESTRICTION: 0
OD_INFERIOR_NASAL_RESTRICTION: 0
OS_INFERIOR_TEMPORAL_RESTRICTION: 0
OD_INFERIOR_TEMPORAL_RESTRICTION: 0
OD_SUPERIOR_TEMPORAL_RESTRICTION: 0
OD_SUPERIOR_NASAL_RESTRICTION: 0
OS_SUPERIOR_TEMPORAL_RESTRICTION: 0
OS_NORMAL: 1
OS_SUPERIOR_NASAL_RESTRICTION: 0
METHOD: COUNTING FINGERS
OD_NORMAL: 1

## 2025-01-15 ASSESSMENT — PACHYMETRY
OS_CT(UM): 608
OD_CT(UM): 620

## 2025-01-15 ASSESSMENT — TONOMETRY
IOP_METHOD: GOLDMANN APPLANATION
OD_IOP_MMHG: 15
OS_IOP_MMHG: 16.5

## 2025-04-11 DIAGNOSIS — E03.9 HYPOTHYROIDISM, UNSPECIFIED TYPE: ICD-10-CM

## 2025-04-11 RX ORDER — LEVOTHYROXINE SODIUM 100 UG/1
100 TABLET ORAL DAILY
Qty: 90 TABLET | Refills: 3 | Status: SHIPPED | OUTPATIENT
Start: 2025-04-11

## 2025-04-20 NOTE — PROGRESS NOTES
4/21/2025 CC: 69 y.o. presents for glaucoma evaluation. Referred by Dr Venegas    Past ocular history: POAG, Pseudophakia OU  Family history: .Glaucoma in father (traumatic),  AMD in father  Past medical history: Hypothyroidism, others per chart   Social history: denies tobacco     Eye medications:   Both eyes: Timolol qam    Allergy:  NKDA    Testing:    HVF 24-2 4/21/2025: OD- mild GD, ? paracentral scotoma, MD -3.8. OS- mild GD, MD -3.4    OCT 1/15/2025: OD- thin S, avg 83. OS- thin S, avg 76 um. Mac.: ERM OD>OS, /261    Pachymetry: 620/608    Gonioscopy: open    Tmax: 23/20    Assessment:   Primary open angle glaucoma, mild OD, suspect OS   - FU Dr Venegas. Saw Dr. Waters March 2020 - agree glaucoma suspect and consider treating if any change/reproducibility on visual field testing.   - Tr glaucoma in father  - Thick C  - Testing: OCT- stable, VF- ? Fluctuation OD  - Using Timolol qam since 7/2022.  Target IOP:  teens  -IOP 4/21/2025:13 OU. Switch Timolol qm to Latanoprost qhs.     Pseudophakia OU  - s/p PCIOL OD 7/2019, OS 3/2021  - s/p Yag Cap OD, 10/2022.  - Mild PCO OS    RE  - FU DR Venegas  - Myopia, near add.  - Diplopia: Since 2021/2022, has noted some mild difficulty focusing at distance after looking at near object. Mild binocular oblique diplopia. Notices at end gaze on left and right gaze. 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 CTM    ERM OD  - FU Dr Venegas: Monitor ERM for now, plan to recheck OCT macula with next DFE. We will consider referral to retina service as needed.     Plan:   I explained my findings. POAG OD, stable    Eye medications:   Both eyes:  Switch Timolol qm to Latanoprost qhs.     Return in 1 mo, IOP check.   RTC Dr Venegas as scheduled.

## 2025-04-21 ENCOUNTER — APPOINTMENT (OUTPATIENT)
Dept: OPHTHALMOLOGY | Age: 70
End: 2025-04-21
Payer: COMMERCIAL

## 2025-04-21 DIAGNOSIS — H40.1111 PRIMARY OPEN ANGLE GLAUCOMA (POAG) OF RIGHT EYE, MILD STAGE: Primary | ICD-10-CM

## 2025-04-21 DIAGNOSIS — H40.002 GLAUCOMA SUSPECT, LEFT EYE: ICD-10-CM

## 2025-04-21 PROCEDURE — 99213 OFFICE O/P EST LOW 20 MIN: CPT | Performed by: OPHTHALMOLOGY

## 2025-04-21 PROCEDURE — 92083 EXTENDED VISUAL FIELD XM: CPT | Performed by: OPHTHALMOLOGY

## 2025-04-21 RX ORDER — LATANOPROST 50 UG/ML
1 SOLUTION/ DROPS OPHTHALMIC NIGHTLY
Qty: 2.5 ML | Refills: 6 | Status: SHIPPED | OUTPATIENT
Start: 2025-04-21

## 2025-04-21 ASSESSMENT — EXTERNAL EXAM - RIGHT EYE: OD_EXAM: NORMAL

## 2025-04-21 ASSESSMENT — ENCOUNTER SYMPTOMS
PSYCHIATRIC NEGATIVE: 0
CARDIOVASCULAR NEGATIVE: 0
ENDOCRINE NEGATIVE: 0
MUSCULOSKELETAL NEGATIVE: 0
EYES NEGATIVE: 1
GASTROINTESTINAL NEGATIVE: 0
CONSTITUTIONAL NEGATIVE: 0
HEMATOLOGIC/LYMPHATIC NEGATIVE: 0
ALLERGIC/IMMUNOLOGIC NEGATIVE: 0
RESPIRATORY NEGATIVE: 0
NEUROLOGICAL NEGATIVE: 0

## 2025-04-21 ASSESSMENT — PACHYMETRY
OD_CT(UM): 620
OS_CT(UM): 608

## 2025-04-21 ASSESSMENT — VISUAL ACUITY
OS_CC: 20/25
OD_CC+: -2
CORRECTION_TYPE: GLASSES
METHOD: SNELLEN - LINEAR
OS_CC+: +2
OD_CC: 20/20

## 2025-04-21 ASSESSMENT — SLIT LAMP EXAM - LIDS: COMMENTS: GOOD POSITION

## 2025-04-21 ASSESSMENT — TONOMETRY
OD_IOP_MMHG: 13
OS_IOP_MMHG: 13
IOP_METHOD: GOLDMANN APPLANATION

## 2025-04-21 ASSESSMENT — CUP TO DISC RATIO
OD_RATIO: 0.75
OS_RATIO: 0.6

## 2025-04-21 ASSESSMENT — EXTERNAL EXAM - LEFT EYE: OS_EXAM: NORMAL

## 2025-05-15 NOTE — PROGRESS NOTES
5/19/2025 CC: 69 y.o. presents for 1 mo glaucoma FU w/ IOP check.    Past ocular history: POAG, Pseudophakia OU  Family history: .Glaucoma in father (traumatic),  AMD in father  Past medical history: Hypothyroidism, others per chart   Social history: denies tobacco     Eye medications:   Both eyes: Latanoprost qhs     Allergy:  NKDA    Testing:    HVF 24-2 4/21/2025: OD- mild GD, ? paracentral scotoma, MD -3.8. OS- mild GD, MD -3.4    OCT 1/15/2025: OD- thin S, avg 83. OS- thin S, avg 76 um. Mac.: ERM OD>OS, /261    Pachymetry: 620/608    Gonioscopy: open    Tmax: 23/20    Assessment:   Primary open angle glaucoma, mild OD, suspect OS   - FU Dr Venegas. Saw Dr. Waters March 2020 - agree glaucoma suspect and consider treating if any change/reproducibility on visual field testing.   - Tr glaucoma in father  - Thick C  - Testing: OCT- stable, VF- ? Fluctuation OD  - Using Timolol qam since 7/2022.  Target IOP:  teens  - IOP 4/21/2025:13 OU. Switch Timolol qm to Latanoprost qhs.   - 5/19/2025: IOP 12 OU. Stable exam.    Pseudophakia OU  - s/p PCIOL OD 7/2019, OS 3/2021  - s/p Yag Cap OD, 10/2022.  - Mild PCO OS    RE  - FU DR Venegas  - Myopia, near add.  - Diplopia: Since 2021/2022, has noted some mild difficulty focusing at distance after looking at near object. Mild binocular oblique diplopia. Notices at end gaze on left and right gaze. 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 CTM    ERM OD  - FU Dr Venegas: Monitor ERM for now, plan to recheck OCT macula with next DFE. We will consider referral to retina service as needed.     Plan:   I explained my findings. POAG OD, stable    Eye medications:   Both eyes:  Continue Latanoprost qhs.     Return in 5 mo, OCT  RTC Dr Venegas as scheduled.

## 2025-05-19 ENCOUNTER — APPOINTMENT (OUTPATIENT)
Dept: OPHTHALMOLOGY | Age: 70
End: 2025-05-19
Payer: COMMERCIAL

## 2025-05-19 DIAGNOSIS — H40.002 GLAUCOMA SUSPECT, LEFT EYE: ICD-10-CM

## 2025-05-19 DIAGNOSIS — H40.1111 PRIMARY OPEN ANGLE GLAUCOMA (POAG) OF RIGHT EYE, MILD STAGE: Primary | ICD-10-CM

## 2025-05-19 PROCEDURE — 99212 OFFICE O/P EST SF 10 MIN: CPT | Performed by: OPHTHALMOLOGY

## 2025-05-19 ASSESSMENT — ENCOUNTER SYMPTOMS
PSYCHIATRIC NEGATIVE: 0
CONSTITUTIONAL NEGATIVE: 0
RESPIRATORY NEGATIVE: 0
ALLERGIC/IMMUNOLOGIC NEGATIVE: 0
MUSCULOSKELETAL NEGATIVE: 0
CARDIOVASCULAR NEGATIVE: 0
ENDOCRINE NEGATIVE: 0
NEUROLOGICAL NEGATIVE: 0
EYES NEGATIVE: 1
GASTROINTESTINAL NEGATIVE: 0
HEMATOLOGIC/LYMPHATIC NEGATIVE: 0

## 2025-05-19 ASSESSMENT — TONOMETRY
IOP_METHOD: GOLDMANN APPLANATION
OD_IOP_MMHG: 12
OS_IOP_MMHG: 12

## 2025-05-19 ASSESSMENT — VISUAL ACUITY
OS_CC+: -1
METHOD: SNELLEN - LINEAR
OD_CC: 20/20
OD_CC+: -1
CORRECTION_TYPE: GLASSES
OS_CC: 20/20

## 2025-05-19 ASSESSMENT — PACHYMETRY
OD_CT(UM): 620
OS_CT(UM): 608

## 2025-05-19 ASSESSMENT — CUP TO DISC RATIO
OS_RATIO: 0.6
OD_RATIO: 0.75

## 2025-05-19 ASSESSMENT — SLIT LAMP EXAM - LIDS: COMMENTS: GOOD POSITION

## 2025-05-19 ASSESSMENT — EXTERNAL EXAM - LEFT EYE: OS_EXAM: NORMAL

## 2025-05-19 ASSESSMENT — EXTERNAL EXAM - RIGHT EYE: OD_EXAM: NORMAL

## 2025-07-16 ENCOUNTER — APPOINTMENT (OUTPATIENT)
Dept: OPHTHALMOLOGY | Age: 70
End: 2025-07-16
Payer: COMMERCIAL

## 2025-07-27 ASSESSMENT — CUP TO DISC RATIO
OD_RATIO: 0.75
OS_RATIO: 0.6

## 2025-07-27 ASSESSMENT — SLIT LAMP EXAM - LIDS: COMMENTS: GOOD POSITION

## 2025-07-27 ASSESSMENT — EXTERNAL EXAM - RIGHT EYE: OD_EXAM: NORMAL

## 2025-07-27 ASSESSMENT — EXTERNAL EXAM - LEFT EYE: OS_EXAM: NORMAL

## 2025-07-27 NOTE — PROGRESS NOTES
Open-angle glaucoma of right eye, mild [id number]  Glaucoma suspect of left eyeH40.002  -(+)FH glaucoma - father (traumatic glaucoma)  -Was considered a glaucoma suspect in the past at [facility].  -Increased cup to disc ratio OU. IOP WNL.   -Pachymetry (2/26/20) - 620/608  -OCT RNFL (7/30/25) - SS: 8/10 OU. OD: Thin S. OS: Bord S. Stable from 12/11/23.   -HVF 24-2 (3/13/24) - OD: 2%FP 3%FN. Scattered, possible early inferior arcuate. OS: Good reliability. Scattered, but WNL. Mild variation from prior HVF, but overall stable pattern.   -Saw Dr. Waters March 2020 - agree glaucoma suspect and consider treating if any change/reproducibility on visual field testing.   -Plan: Thick corneas, normal IOP, however borderline changes on OCT RNFL and HVF 24-2 OD>OS. Changes/defects have been reproducible on subsequent testing. Given this information, intiated treatment OD - started Timolol 0.5% OD qAM (11/10/21), based on OCT RNFL, recommend using Timolol OU qAM (7/27/22) - continue OU. May consider adding second gtt vs SLT if true progression seen on testing.   -Due to potential progression since 2021, recommended evaluation with glaucoma service. Saw Dr. Lawrence 5/19/25 - switched from timolol to latanoprost OU QHS (4/21/25).   -F/u with Dr. Lawrence as scheduled. Patient states eyes have been more red since starting latanoprost in April. Advised to use artificial tears 2-4x/day in case dry eye is playing a role. Otherwise, may discuss treatment options with Dr. Lawrence, may consider SLT as needed.   -F/u with me 1 year for comprehensive exam with OCT RNFL and OCT macula.     History of YAG laser capsulotomy of lens of right eyeZ98.41  Pseudophakia of left eyeZ96.1 - 3/18/21  PCO (posterior capsular opacification), [id number]  PCO (posterior capsular opacification), left eye  Pseudophakia of right eyeZ96.1 - 7/11/19  -S/p YAG capsulotomy OD 10/18/22 - vision improved. Mild PCO OS, not visually significant. Will monitor for now  and can consider YAG capsulotomy in the future if condition worsens.     Diplopia  -Since 2021/2022, has noted some mild difficulty focusing at distance after looking at near object. Mild binocular oblique diplopia. Notices at end gaze on left and right gaze.  -On exam, full motility, no significant ocular misalignment noted (<1pd BD over OD), however, patient with mild right head tilt and notes that oblique diplopia is worse on right gaze.   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 Patient states in 2019 fell and hit head, had loss of consciousness.  -CT head wo contrast (2/12/19) - no acute intracranial abnormality.   -Tried prisms in office - patient previously did not notice any significant improvement, however, has become more symptomatic over time and will consider trying prism now. Will Rx 2pd BD over OD. This correction is not consistent with LHT, however, this is what helps relieve diplopia in office (checked multiple times) Monitor. Can consider repeat imaging if condition worsens. Condition is stable. Symptoms stable with glasses - continue.    Vitreous fkxdepC13.9  Epiretinal membrane (ERM) of right eyeH35.371  -OCT macula (1/15/25) - SS: 7/10 OD and 7/10 OS. OD: ERM temporal to fovea. No edema. Intact IS-OS. OS: Normal thickness and contour. Intact IS-OS. No edema. 338/261. Stable from 9/28/22.    -Noticing floater OD is more symptomatic, mostly when both eyes are open, not as much if OS is closed. Unclear reason for this unless also noticing floaters OS when both eyes are open. No retinal tear or detachment seen on last dilated exam. Monitor with annual dilated exams. Recommend observation for now and consider PPV if becomes more symptomatic.  -Monitor ERM for now, plan to recheck OCT macula with next DFE.  We will consider referral to retina service as needed.   -F/u with me 1 year for comprehensive exam with OCT RNFL and OCT macula.     Cornea nrmgfxcL95.51  -Rare guttata OU. No  significant corneal edema at this time. Monitor.     Family history of macular [id number]  -(+)Father - wet?  -Macular exam WNL at this time. Monitor.     Bilateral bqsuilS23.13  Bilateral jouuaueeyqL77.4  -Current Rx with 2pd BD over OD - advised to let me know if unable to tolerate prism.   -F/u with me 1 year for comprehensive exam with OCT RNFL and OCT macula.

## 2025-07-30 ENCOUNTER — APPOINTMENT (OUTPATIENT)
Dept: OPHTHALMOLOGY | Age: 70
End: 2025-07-30
Payer: COMMERCIAL

## 2025-07-30 DIAGNOSIS — Z83.518 FAMILY HISTORY OF MACULAR DEGENERATION: ICD-10-CM

## 2025-07-30 DIAGNOSIS — H40.002 GLAUCOMA SUSPECT, LEFT EYE: ICD-10-CM

## 2025-07-30 DIAGNOSIS — H53.2 DIPLOPIA: ICD-10-CM

## 2025-07-30 DIAGNOSIS — Z98.41: ICD-10-CM

## 2025-07-30 DIAGNOSIS — H35.371 EPIRETINAL MEMBRANE, RIGHT EYE: ICD-10-CM

## 2025-07-30 DIAGNOSIS — H26.492 PCO (POSTERIOR CAPSULAR OPACIFICATION), LEFT: ICD-10-CM

## 2025-07-30 DIAGNOSIS — H52.13 MYOPIA OF BOTH EYES: ICD-10-CM

## 2025-07-30 DIAGNOSIS — H18.513 CORNEAL GUTTATA OF BOTH EYES: ICD-10-CM

## 2025-07-30 DIAGNOSIS — H52.4 PRESBYOPIA: ICD-10-CM

## 2025-07-30 DIAGNOSIS — Z96.1 PSEUDOPHAKIA: ICD-10-CM

## 2025-07-30 DIAGNOSIS — H43.9 VITREOUS DEBRIS: ICD-10-CM

## 2025-07-30 DIAGNOSIS — H40.1111 PRIMARY OPEN ANGLE GLAUCOMA (POAG) OF RIGHT EYE, MILD STAGE: Primary | ICD-10-CM

## 2025-07-30 PROCEDURE — 99213 OFFICE O/P EST LOW 20 MIN: CPT | Performed by: OPHTHALMOLOGY

## 2025-07-30 PROCEDURE — 92133 CPTRZD OPH DX IMG PST SGM ON: CPT | Performed by: OPHTHALMOLOGY

## 2025-07-30 ASSESSMENT — ENCOUNTER SYMPTOMS
EYES NEGATIVE: 1
CONSTITUTIONAL NEGATIVE: 0
NEUROLOGICAL NEGATIVE: 0
CARDIOVASCULAR NEGATIVE: 0
GASTROINTESTINAL NEGATIVE: 0
HEMATOLOGIC/LYMPHATIC NEGATIVE: 0
PSYCHIATRIC NEGATIVE: 0
ENDOCRINE NEGATIVE: 0
MUSCULOSKELETAL NEGATIVE: 0
RESPIRATORY NEGATIVE: 0
ALLERGIC/IMMUNOLOGIC NEGATIVE: 0

## 2025-07-30 ASSESSMENT — REFRACTION_WEARINGRX
OD_SPHERE: -1.75
OS_CYLINDER: -0.50
OS_SPHERE: -2.25
OD_CYLINDER: -0.25
OS_ADD: +2.50
OD_AXIS: 118
OD_ADD: +2.50
OS_AXIS: 105

## 2025-07-30 ASSESSMENT — PACHYMETRY
OS_CT(UM): 608
OD_CT(UM): 620

## 2025-07-30 ASSESSMENT — VISUAL ACUITY
CORRECTION_TYPE: GLASSES
OS_CC: 20/20
OD_CC: 20/20
METHOD: SNELLEN - LINEAR

## 2025-07-30 ASSESSMENT — TONOMETRY
OD_IOP_MMHG: 14
IOP_METHOD: GOLDMANN APPLANATION
OS_IOP_MMHG: 14

## 2025-10-20 ENCOUNTER — APPOINTMENT (OUTPATIENT)
Dept: OPHTHALMOLOGY | Age: 70
End: 2025-10-20
Payer: COMMERCIAL

## 2026-08-05 ENCOUNTER — APPOINTMENT (OUTPATIENT)
Dept: OPHTHALMOLOGY | Age: 71
End: 2026-08-05
Payer: COMMERCIAL